# Patient Record
Sex: FEMALE | Race: WHITE | NOT HISPANIC OR LATINO | Employment: STUDENT | URBAN - METROPOLITAN AREA
[De-identification: names, ages, dates, MRNs, and addresses within clinical notes are randomized per-mention and may not be internally consistent; named-entity substitution may affect disease eponyms.]

---
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
## 2024-01-01 DIAGNOSIS — N94.6 DYSMENORRHEA: ICD-10-CM

## 2024-01-03 RX ORDER — NORGESTIMATE AND ETHINYL ESTRADIOL 7DAYSX3 LO
1 KIT ORAL
Qty: 90 TABLET | Refills: 3 | Status: SHIPPED | OUTPATIENT
Start: 2024-01-03

## 2024-01-07 PROBLEM — F33.1 MODERATE EPISODE OF RECURRENT MAJOR DEPRESSIVE DISORDER: Status: ACTIVE | Noted: 2022-11-06

## 2024-01-30 DIAGNOSIS — F41.9 ANXIETY: ICD-10-CM

## 2024-01-30 RX ORDER — ESCITALOPRAM OXALATE 20 MG/1
20 TABLET ORAL
Qty: 90 TABLET | Refills: 3 | Status: SHIPPED | OUTPATIENT
Start: 2024-01-30

## 2024-03-14 NOTE — TELEPHONE ENCOUNTER
Please let her know, just shared it to her portal!   SUBJECTIVE:   Doing wee. OOB to chair R upper drain removed by plastics this morning   OVERNIGHT EVENTS: none    Vital Signs Last 24 Hrs  T(C): 36.7 (14 Mar 2024 13:17), Max: 37.2 (14 Mar 2024 05:00)  T(F): 98 (14 Mar 2024 13:17), Max: 98.9 (14 Mar 2024 05:00)  HR: 78 (14 Mar 2024 13:17) (71 - 87)  BP: 102/59 (14 Mar 2024 13:17) (102/59 - 119/71)  BP(mean): --  RR: 18 (14 Mar 2024 13:17) (18 - 20)  SpO2: 98% (14 Mar 2024 13:17) (94% - 98%)    Parameters below as of 14 Mar 2024 05:00  Patient On (Oxygen Delivery Method): room air        PHYSICAL EXAM:    Constitutional: No Acute Distress     Neurological: Awake alert Ox3, Speech clear Following Commands, Moving all Extremities 5/5 except R HF 4/5 KF/KE 4+/5 DF/PF 5/5  R Lower HMV 70cc/ 24 hrs . Incision prineo  C/D/I     Pulmonary: Clear to Auscultation,     Cardiovascular: S1, S2, Regular rate and rhythm     Gastrointestinal: Soft, Non-tender, Non-distended     LABS:                        9.9    8.15  )-----------( 302      ( 13 Mar 2024 05:57 )             31.3    03-13    134<L>  |  99  |  20  ----------------------------<  102<H>  3.8   |  24  |  0.58    Ca    8.5      13 Mar 2024 05:57  Phos  3.4     03-13  Mg     2.0     03-13    TPro  5.1<L>  /  Alb  2.4<L>  /  TBili  0.4  /  DBili  x   /  AST  133<H>  /  ALT  160<H>  /  AlkPhos  211<H>  03-13          IMAGING:         MEDICATIONS:    acetaminophen     Tablet .. 1000 milliGRAM(s) Oral every 6 hours PRN Temp greater or equal to 38C (100.4F), Mild Pain (1 - 3)  gabapentin 600 milliGRAM(s) Oral every 8 hours  HYDROmorphone  Injectable 0.25 milliGRAM(s) IV Push every 4 hours PRN break through pain  methocarbamol 750 milliGRAM(s) Oral every 8 hours  ondansetron Injectable 4 milliGRAM(s) IV Push every 6 hours PRN Nausea and/or Vomiting  oxyCODONE    IR 5 milliGRAM(s) Oral every 4 hours PRN Moderate Pain (4 - 6)  oxyCODONE    IR 10 milliGRAM(s) Oral every 4 hours PRN Severe Pain (7 - 10)  magnesium hydroxide Suspension 30 milliLiter(s) Oral every 12 hours PRN Constipation  polyethylene glycol 3350 17 Gram(s) Oral two times a day  senna 2 Tablet(s) Oral at bedtime  enoxaparin Injectable 40 milliGRAM(s) SubCutaneous <User Schedule>  multivitamin 1 Tablet(s) Oral daily  sodium chloride 2 Gram(s) Oral every 8 hours      DIET:

## 2024-04-19 ENCOUNTER — PATIENT MESSAGE (OUTPATIENT)
Dept: PRIMARY CARE CLINIC | Facility: CLINIC | Age: 19
End: 2024-04-19
Payer: COMMERCIAL

## 2024-04-22 NOTE — TELEPHONE ENCOUNTER
No care due was identified.  [facility] Embedded Care Due Messages. Reference number: [id number].   4/22/2024 9:36:09 AM CDT

## 2024-04-23 RX ORDER — PROPRANOLOL HYDROCHLORIDE 10 MG/1
TABLET ORAL
Qty: 90 TABLET | Refills: 2 | Status: SHIPPED | OUTPATIENT
Start: 2024-04-23

## 2024-04-23 NOTE — TELEPHONE ENCOUNTER
Ade Marmolejo  is requesting a refill authorization.  Brief Assessment and Rationale for Refill:  Approve     Medication Therapy Plan:         Comments:     Note composed:4:47 AM 04/23/2024

## 2024-05-24 ENCOUNTER — TELEPHONE (OUTPATIENT)
Dept: OBSTETRICS AND GYNECOLOGY | Facility: CLINIC | Age: 19
End: 2024-05-24
Payer: COMMERCIAL

## 2024-05-24 NOTE — TELEPHONE ENCOUNTER
Messaging mother Lynn thru her personal chart, requesting a new pt appt for Ade for AUB on OCPs.  She is a new pt.

## 2024-05-27 NOTE — TELEPHONE ENCOUNTER
New pt for Dr. Mensah.  She was put on Tri lo adenike last year for heavy periods.  States her periods are about 7 days and the first couple days are heavy, no improvement with OCPs.  Scheduled at next available with Dr. Mensah.  Offered sooner with a different provider but she declined.

## 2024-07-19 ENCOUNTER — OFFICE VISIT (OUTPATIENT)
Dept: OBSTETRICS AND GYNECOLOGY | Facility: CLINIC | Age: 19
End: 2024-07-19
Payer: COMMERCIAL

## 2024-07-19 VITALS
SYSTOLIC BLOOD PRESSURE: 112 MMHG | DIASTOLIC BLOOD PRESSURE: 78 MMHG | WEIGHT: 138.44 LBS | BODY MASS INDEX: 22.01 KG/M2

## 2024-07-19 DIAGNOSIS — Z00.00 WELL WOMAN EXAM WITHOUT GYNECOLOGICAL EXAM: ICD-10-CM

## 2024-07-19 DIAGNOSIS — N93.9 ABNORMAL UTERINE BLEEDING (AUB): Primary | ICD-10-CM

## 2024-07-19 DIAGNOSIS — N94.3 PMS (PREMENSTRUAL SYNDROME): ICD-10-CM

## 2024-07-19 PROCEDURE — 3078F DIAST BP <80 MM HG: CPT | Mod: CPTII,S$GLB,, | Performed by: OBSTETRICS & GYNECOLOGY

## 2024-07-19 PROCEDURE — 99203 OFFICE O/P NEW LOW 30 MIN: CPT | Mod: S$GLB,,, | Performed by: OBSTETRICS & GYNECOLOGY

## 2024-07-19 PROCEDURE — 99999 PR PBB SHADOW E&M-EST. PATIENT-LVL III: CPT | Mod: PBBFAC,,, | Performed by: OBSTETRICS & GYNECOLOGY

## 2024-07-19 PROCEDURE — 3074F SYST BP LT 130 MM HG: CPT | Mod: CPTII,S$GLB,, | Performed by: OBSTETRICS & GYNECOLOGY

## 2024-07-19 PROCEDURE — 3008F BODY MASS INDEX DOCD: CPT | Mod: CPTII,S$GLB,, | Performed by: OBSTETRICS & GYNECOLOGY

## 2024-07-19 RX ORDER — DROSPIRENONE AND ETHINYL ESTRADIOL 0.02-3(28)
1 KIT ORAL DAILY
Qty: 90 TABLET | Refills: 4 | Status: SHIPPED | OUTPATIENT
Start: 2024-07-19 | End: 2024-08-18

## 2024-07-19 NOTE — PROGRESS NOTES
Subjective     Patient ID: Ade Marmolejo is a 18 y.o. female.    Chief Complaint:  heavy flow and pelvic cramping (While menstruating )      History of Present Illness  HPI  Pt is 17 y/o here to discuss her menses.  Pt started having periods at age 9.  Regular, lasts 7 days.  The first few days, has very heavy flow.  Uses pads and has to change them almost every hour.  Will having cramping pain with the 4 days of heavy flow.  Will have passage of some blood clots.  Pt also has some mood changes around her menses.  Having PMS symptoms.  Pt is on OCPs, started by Dr. Valladares.  Started on Tri Lo Shira and been on it about a year.  On the OCPs, still having heavy flow.  Also still having some mood changes around her menses, which is not improving with the pills. Pt is taking the pills daily and takes them regularly.  Pt also having headaches intermittently.  Not sure if happening near menses.  Not migraines.  No auras.  Pt denies any issues with hair growth or cystic acne.  Pt has never been sexually active.    GYN & OB History  Patient's last menstrual period was 2024 (exact date).   Date of Last Pap: No result found    OB History    Para Term  AB Living   0 0 0 0 0 0   SAB IAB Ectopic Multiple Live Births   0 0 0 0 0       Review of Systems  Review of Systems   Constitutional:  Negative for activity change, appetite change, chills, fatigue, fever and unexpected weight change.   Respiratory:  Negative for cough, shortness of breath and wheezing.    Cardiovascular:  Negative for chest pain and leg swelling.   Gastrointestinal:  Negative for abdominal pain, constipation, diarrhea, nausea and vomiting.   Endocrine: Negative for hair loss and hot flashes.   Genitourinary:  Positive for dysmenorrhea and menstrual problem. Negative for decreased libido, dyspareunia, dysuria, frequency, pelvic pain, vaginal bleeding, vaginal discharge and vaginal pain.   Integumentary:  Negative for acne, hair changes,  nipple discharge and breast skin changes.   Neurological:  Negative for headaches.   Psychiatric/Behavioral:  Negative for sleep disturbance.    Breast: Negative for mastodynia, nipple discharge and skin changes         Objective   Physical Exam:   Constitutional: She is oriented to person, place, and time. She appears well-developed and well-nourished. No distress.    HENT:   Head: Normocephalic and atraumatic.    Eyes: Conjunctivae and EOM are normal.    Neck: No thyromegaly present.    Cardiovascular:  Normal rate and intact distal pulses.             Pulmonary/Chest: Effort normal.        Abdominal: Soft. She exhibits no distension.             Musculoskeletal: Normal range of motion.       Neurological: She is alert and oriented to person, place, and time.    Skin: Skin is warm and dry. She is not diaphoretic.    Psychiatric: She has a normal mood and affect. Her behavior is normal.            Assessment and Plan     1. Abnormal uterine bleeding (AUB)    2. PMS (premenstrual syndrome)             Plan:  Ade was seen today for heavy flow and pelvic cramping.    Diagnoses and all orders for this visit:    Abnormal uterine bleeding (AUB)  - Discussed symptoms with patient and discussed medication that she is taking  - Given her symptoms, feel she would do better with a monophasic medication, opposed to the triphasic that she is on  - Recommend a low does monophasic OCP.  The lower dosing estrogen will help decrease flow, which appears heavy.  The anti-androgen effects of the hazel will help with acne and PMS symptoms.  The monophasic will help with breakthrough bleeding, mood changes, and her acne.    - Pt to reach out if not doing well with new medication.  -     drospirenone-ethinyl estradioL (HAZEL) 3-0.02 mg per tablet; Take 1 tablet by mouth once daily.    PMS (premenstrual syndrome)  -     drospirenone-ethinyl estradioL (HAZEL) 3-0.02 mg per tablet; Take 1 tablet by mouth once daily.      Well woman exam  without gynecological exam  - Defer paps until age 21.   - STD screening declined. Counseled on screening once she becomes sexually active.        No orders of the defined types were placed in this encounter.      No follow-ups on file.     Ml Finch MD  Obstetrics & Gynecology

## 2024-07-22 ENCOUNTER — OFFICE VISIT (OUTPATIENT)
Dept: PRIMARY CARE CLINIC | Facility: CLINIC | Age: 19
End: 2024-07-22
Payer: COMMERCIAL

## 2024-07-22 VITALS
WEIGHT: 135.56 LBS | HEIGHT: 66 IN | HEART RATE: 65 BPM | RESPIRATION RATE: 16 BRPM | DIASTOLIC BLOOD PRESSURE: 70 MMHG | OXYGEN SATURATION: 98 % | SYSTOLIC BLOOD PRESSURE: 112 MMHG | BODY MASS INDEX: 21.79 KG/M2

## 2024-07-22 DIAGNOSIS — Z00.00 ANNUAL PHYSICAL EXAM: Primary | ICD-10-CM

## 2024-07-22 DIAGNOSIS — L73.9 FOLLICULITIS: ICD-10-CM

## 2024-07-22 DIAGNOSIS — B07.9 VIRAL WARTS, UNSPECIFIED TYPE: ICD-10-CM

## 2024-07-22 DIAGNOSIS — Z13.1 SCREENING FOR DIABETES MELLITUS: ICD-10-CM

## 2024-07-22 DIAGNOSIS — F41.1 GAD (GENERALIZED ANXIETY DISORDER): ICD-10-CM

## 2024-07-22 PROCEDURE — 1159F MED LIST DOCD IN RCRD: CPT | Mod: CPTII,S$GLB,, | Performed by: STUDENT IN AN ORGANIZED HEALTH CARE EDUCATION/TRAINING PROGRAM

## 2024-07-22 PROCEDURE — 3008F BODY MASS INDEX DOCD: CPT | Mod: CPTII,S$GLB,, | Performed by: STUDENT IN AN ORGANIZED HEALTH CARE EDUCATION/TRAINING PROGRAM

## 2024-07-22 PROCEDURE — 1160F RVW MEDS BY RX/DR IN RCRD: CPT | Mod: CPTII,S$GLB,, | Performed by: STUDENT IN AN ORGANIZED HEALTH CARE EDUCATION/TRAINING PROGRAM

## 2024-07-22 PROCEDURE — 99999 PR PBB SHADOW E&M-EST. PATIENT-LVL IV: CPT | Mod: PBBFAC,,, | Performed by: STUDENT IN AN ORGANIZED HEALTH CARE EDUCATION/TRAINING PROGRAM

## 2024-07-22 PROCEDURE — 3078F DIAST BP <80 MM HG: CPT | Mod: CPTII,S$GLB,, | Performed by: STUDENT IN AN ORGANIZED HEALTH CARE EDUCATION/TRAINING PROGRAM

## 2024-07-22 PROCEDURE — 3074F SYST BP LT 130 MM HG: CPT | Mod: CPTII,S$GLB,, | Performed by: STUDENT IN AN ORGANIZED HEALTH CARE EDUCATION/TRAINING PROGRAM

## 2024-07-22 PROCEDURE — 99395 PREV VISIT EST AGE 18-39: CPT | Mod: S$GLB,,, | Performed by: STUDENT IN AN ORGANIZED HEALTH CARE EDUCATION/TRAINING PROGRAM

## 2024-07-22 RX ORDER — ALPRAZOLAM 0.25 MG/1
0.25 TABLET ORAL DAILY PRN
Qty: 30 TABLET | Refills: 0 | Status: SHIPPED | OUTPATIENT
Start: 2024-07-22 | End: 2024-07-22 | Stop reason: SDUPTHER

## 2024-07-22 RX ORDER — MUPIROCIN 20 MG/G
OINTMENT TOPICAL 3 TIMES DAILY
Qty: 30 G | Refills: 0 | Status: SHIPPED | OUTPATIENT
Start: 2024-07-22 | End: 2024-07-22 | Stop reason: SDUPTHER

## 2024-07-22 NOTE — TELEPHONE ENCOUNTER
No care due was identified.  [facility] Embedded Care Due Messages. Reference number: [id number].   7/22/2024 4:02:08 PM CDT

## 2024-07-22 NOTE — PROGRESS NOTES
SUBJECTIVE     Chief Complaint   Patient presents with    Annual Exam       HPI  Ade Marmolejo is a very pleasant 18 y.o. female with medical diagnoses as listed in the medical history and problem list that presents for annual exam.      MDD/  Generalized anxiety disorder.  Dx 2020  On Lexapro 20mg daily  Originally on Lexapro, then on Celexa which she did not tolerate, switched to Zoloft, then back to lexapro  Propranolol 10prn tid for panic attacks  -Denies SI/HI/AVH  Talk therapy prn       Irregular cycles: Started on OCP for irregular, heavy cycles. Taking OCP as directed without issues     Wart of hand: R hand.  Has seen Dermatology intermittently for wart freezing with temporary improvement.    Health Maintenance         Date Due Completion Date    Hepatitis C Screening Never done ---    HIV Screening Never done ---    Chlamydia Screening Never done ---    COVID-19 Vaccine (4 - 2023-24 season) 09/01/2023 1/17/2022    Influenza Vaccine (1) 09/01/2024 9/22/2023    TETANUS VACCINE 07/26/2026 7/26/2016    DTaP/Tdap/Td Vaccines (7 - Td or Tdap) 07/26/2026 7/26/2016            PAST MEDICAL HISTORY:  Past Medical History:   Diagnosis Date    Allergy     Scoliosis        PAST SURGICAL HISTORY:  Past Surgical History:   Procedure Laterality Date    TYMPANOSTOMY TUBE PLACEMENT  01/01/2007       SOCIAL HISTORY:  Social History     Socioeconomic History    Marital status: Single   Tobacco Use    Smoking status: Never    Smokeless tobacco: Never   Substance and Sexual Activity    Alcohol use: No    Drug use: No    Sexual activity: Never   Social History Narrative    Lives with parents, 2 sisters, two brothers; no pets       FAMILY HISTORY:  Family History   Problem Relation Name Age of Onset    Hypertension Mother mom     Asthma Sister maylin     Allergies Sister maylin     Other Brother          autoimmune neutropenia    Other Paternal Grandfather          parkinsin's    Birth defects Neg Hx      Cancer Neg Hx       "Chromosomal disorder Neg Hx      Diabetes Neg Hx      Early death Neg Hx      Heart disease Neg Hx      Hyperlipidemia Neg Hx      Mental illness Neg Hx      Seizures Neg Hx      Thyroid disease Neg Hx         ALLERGIES AND MEDICATIONS: updated and reviewed.  Review of patient's allergies indicates:  No Known Allergies  Current Outpatient Medications   Medication Sig Dispense Refill    drospirenone-ethinyl estradioL (HAZEL) 3-0.02 mg per tablet Take 1 tablet by mouth once daily. 90 tablet 4    EScitalopram oxalate (LEXAPRO) 20 MG tablet TAKE 1 TABLET(20 MG) BY MOUTH EVERY DAY 90 tablet 3    hydrOXYzine HCL (ATARAX) 25 MG tablet Take 1 tablet (25 mg total) by mouth nightly as needed (Insomnia). 90 tablet 1    propranoloL (INDERAL) 10 MG tablet TAKE 1 TABLET(10 MG) BY MOUTH THREE TIMES DAILY 90 tablet 2    ALPRAZolam (XANAX) 0.25 MG tablet Take 1 tablet (0.25 mg total) by mouth daily as needed for Anxiety. 30 tablet 0    mupirocin (BACTROBAN) 2 % ointment Apply topically 3 (three) times daily. 30 g 0     No current facility-administered medications for this visit.       ROS  Review of Systems   Constitutional:  Negative for fever and weight loss.   Respiratory:  Negative for cough and shortness of breath.    Cardiovascular:  Negative for chest pain and palpitations.   Gastrointestinal:  Negative for abdominal pain, constipation, diarrhea, nausea and vomiting.   Genitourinary:  Negative for dysuria.   Musculoskeletal:  Negative for back pain and joint pain.   Skin:  Positive for rash.   Neurological:  Negative for dizziness, weakness and headaches.   Psychiatric/Behavioral:  Negative for depression. The patient is nervous/anxious.          OBJECTIVE     Physical Exam  Vitals:    07/22/24 1049   BP: 112/70   Pulse: 65   Resp: 16    Body mass index is 21.88 kg/m².  Weight: 61.5 kg (135 lb 9.3 oz)   Height: 5' 6" (167.6 cm)     Physical Exam  HENT:      Head: Normocephalic and atraumatic.      Nose: Nose normal.      " Mouth/Throat:      Mouth: Mucous membranes are moist.      Pharynx: Oropharynx is clear.   Eyes:      Extraocular Movements: Extraocular movements intact.      Conjunctiva/sclera: Conjunctivae normal.      Pupils: Pupils are equal, round, and reactive to light.   Cardiovascular:      Rate and Rhythm: Normal rate and regular rhythm.   Pulmonary:      Effort: Pulmonary effort is normal.      Breath sounds: Normal breath sounds.   Musculoskeletal:         General: No swelling. Normal range of motion.      Cervical back: Normal range of motion.      Right lower leg: No edema.      Left lower leg: No edema.   Skin:     General: Skin is warm.      Findings: Rash (folliculitis inner thigh) present. No lesion.   Neurological:      General: No focal deficit present.      Mental Status: She is alert and oriented to person, place, and time.      Motor: No weakness.   Psychiatric:         Mood and Affect: Mood normal.         Thought Content: Thought content normal.               ASSESSMENT     18 y.o. female with     1. Annual physical exam    2. Screening for diabetes mellitus    3. MORGAN (generalized anxiety disorder)    4. Folliculitis    5. Viral warts, unspecified type        PLAN:     1. Annual physical exam  -     TSH; Future; Expected date: 07/22/2024  -     Lipid Panel; Future; Expected date: 07/22/2024  -     Comprehensive Metabolic Panel; Future; Expected date: 07/22/2024  -     CBC Auto Differential; Future; Expected date: 07/22/2024    2. Screening for diabetes mellitus  -     Hemoglobin A1C; Future; Expected date: 07/22/2024    3. MORGAN (generalized anxiety disorder)  Overview:  Dx 2020  Cont propranolol 10 tid prn for panic attacks  Lexapro 20mg daily    Stable on medications, continue regimen        Orders:  -     ALPRAZolam (XANAX) 0.25 MG tablet; Take 1 tablet (0.25 mg total) by mouth daily as needed for Anxiety.  Dispense: 30 tablet; Refill: 0    4. Folliculitis  -     mupirocin (BACTROBAN) 2 % ointment; Apply  topically 3 (three) times daily.  Dispense: 30 g; Refill: 0    5. Viral warts, unspecified type  -     Ambulatory referral/consult to Dermatology; Future; Expected date: 07/29/2024          Ira Valladares MD

## 2024-07-23 RX ORDER — MUPIROCIN 20 MG/G
OINTMENT TOPICAL 3 TIMES DAILY
Qty: 30 G | Refills: 0 | Status: SHIPPED | OUTPATIENT
Start: 2024-07-23

## 2024-07-23 RX ORDER — ALPRAZOLAM 0.25 MG/1
0.25 TABLET ORAL DAILY PRN
Qty: 30 TABLET | Refills: 0 | Status: SHIPPED | OUTPATIENT
Start: 2024-07-23 | End: 2024-08-22

## 2024-09-27 RX ORDER — PROPRANOLOL HYDROCHLORIDE 10 MG/1
TABLET ORAL
Qty: 270 TABLET | Refills: 3 | Status: SHIPPED | OUTPATIENT
Start: 2024-09-27

## 2024-10-28 DIAGNOSIS — F41.1 GAD (GENERALIZED ANXIETY DISORDER): ICD-10-CM

## 2024-10-29 RX ORDER — HYDROXYZINE HYDROCHLORIDE 25 MG/1
25 TABLET, FILM COATED ORAL NIGHTLY PRN
Qty: 90 TABLET | Refills: 1 | Status: SHIPPED | OUTPATIENT
Start: 2024-10-29

## 2024-11-08 DIAGNOSIS — F41.9 ANXIETY: ICD-10-CM

## 2024-11-08 RX ORDER — ESCITALOPRAM OXALATE 20 MG/1
20 TABLET ORAL
Qty: 90 TABLET | Refills: 2 | Status: SHIPPED | OUTPATIENT
Start: 2024-11-08

## 2024-11-08 NOTE — TELEPHONE ENCOUNTER
No care due was identified.  [facility] Embedded Care Due Messages. Reference number: [id number].   11/08/2024 2:34:48 PM CST

## 2024-11-09 NOTE — TELEPHONE ENCOUNTER
Refill Decision Note   [facility]  is requesting a refill authorization.  Brief Assessment and Rationale for Refill:  Approve     Medication Therapy Plan:       Medication Reconciliation Completed: No   Comments:     No Care Gaps recommended.     Note composed:6:33 PM 11/08/2024

## 2024-11-12 ENCOUNTER — PATIENT MESSAGE (OUTPATIENT)
Dept: OBSTETRICS AND GYNECOLOGY | Facility: CLINIC | Age: 19
End: 2024-11-12
Payer: COMMERCIAL

## 2024-12-08 ENCOUNTER — PATIENT MESSAGE (OUTPATIENT)
Dept: OBSTETRICS AND GYNECOLOGY | Facility: CLINIC | Age: 19
End: 2024-12-08
Payer: COMMERCIAL

## 2024-12-08 DIAGNOSIS — N93.9 ABNORMAL UTERINE BLEEDING (AUB): Primary | ICD-10-CM

## 2024-12-08 DIAGNOSIS — N94.3 PMS (PREMENSTRUAL SYNDROME): ICD-10-CM

## 2024-12-10 RX ORDER — NORETHINDRONE ACETATE AND ETHINYL ESTRADIOL 1MG-20(21)
1 KIT ORAL DAILY
Qty: 90 TABLET | Refills: 4 | Status: SHIPPED | OUTPATIENT
Start: 2024-12-10 | End: 2025-12-10

## 2025-01-08 ENCOUNTER — PATIENT MESSAGE (OUTPATIENT)
Dept: PRIMARY CARE CLINIC | Facility: CLINIC | Age: 20
End: 2025-01-08
Payer: COMMERCIAL

## 2025-02-19 ENCOUNTER — PATIENT MESSAGE (OUTPATIENT)
Dept: OBSTETRICS AND GYNECOLOGY | Facility: CLINIC | Age: 20
End: 2025-02-19
Payer: COMMERCIAL

## 2025-02-24 RX ORDER — DROSPIRENONE AND ESTETROL 3-14.2(28)
1 KIT ORAL DAILY
Qty: 84 TABLET | Refills: 4 | Status: SHIPPED | OUTPATIENT
Start: 2025-02-24

## 2025-03-21 ENCOUNTER — PATIENT MESSAGE (OUTPATIENT)
Dept: PRIMARY CARE CLINIC | Facility: CLINIC | Age: 20
End: 2025-03-21
Payer: COMMERCIAL

## 2025-05-09 ENCOUNTER — PATIENT MESSAGE (OUTPATIENT)
Dept: OBSTETRICS AND GYNECOLOGY | Facility: CLINIC | Age: 20
End: 2025-05-09
Payer: COMMERCIAL

## 2025-07-17 ENCOUNTER — OFFICE VISIT (OUTPATIENT)
Dept: OBSTETRICS AND GYNECOLOGY | Facility: CLINIC | Age: 20
End: 2025-07-17
Payer: COMMERCIAL

## 2025-07-17 ENCOUNTER — PATIENT OUTREACH (OUTPATIENT)
Dept: ADMINISTRATIVE | Facility: HOSPITAL | Age: 20
End: 2025-07-17
Payer: COMMERCIAL

## 2025-07-17 VITALS
DIASTOLIC BLOOD PRESSURE: 56 MMHG | WEIGHT: 154.75 LBS | HEIGHT: 66 IN | SYSTOLIC BLOOD PRESSURE: 88 MMHG | BODY MASS INDEX: 24.87 KG/M2

## 2025-07-17 DIAGNOSIS — M62.89 PELVIC FLOOR TENSION: ICD-10-CM

## 2025-07-17 DIAGNOSIS — F41.9 ANXIETY: ICD-10-CM

## 2025-07-17 DIAGNOSIS — N92.0 MENORRHAGIA WITH REGULAR CYCLE: Primary | ICD-10-CM

## 2025-07-17 DIAGNOSIS — F32.81 PMDD (PREMENSTRUAL DYSPHORIC DISORDER): ICD-10-CM

## 2025-07-17 PROCEDURE — 3008F BODY MASS INDEX DOCD: CPT | Mod: CPTII,S$GLB,, | Performed by: OBSTETRICS & GYNECOLOGY

## 2025-07-17 PROCEDURE — 1159F MED LIST DOCD IN RCRD: CPT | Mod: CPTII,S$GLB,, | Performed by: OBSTETRICS & GYNECOLOGY

## 2025-07-17 PROCEDURE — 3074F SYST BP LT 130 MM HG: CPT | Mod: CPTII,S$GLB,, | Performed by: OBSTETRICS & GYNECOLOGY

## 2025-07-17 PROCEDURE — 3078F DIAST BP <80 MM HG: CPT | Mod: CPTII,S$GLB,, | Performed by: OBSTETRICS & GYNECOLOGY

## 2025-07-17 PROCEDURE — 99215 OFFICE O/P EST HI 40 MIN: CPT | Mod: S$GLB,,, | Performed by: OBSTETRICS & GYNECOLOGY

## 2025-07-17 PROCEDURE — 99999 PR PBB SHADOW E&M-EST. PATIENT-LVL III: CPT | Mod: PBBFAC,,, | Performed by: OBSTETRICS & GYNECOLOGY

## 2025-07-17 RX ORDER — TRANEXAMIC ACID 650 MG/1
1300 TABLET ORAL 3 TIMES DAILY
Qty: 30 TABLET | Refills: 12 | Status: SHIPPED | OUTPATIENT
Start: 2025-07-17 | End: 2025-07-22

## 2025-07-17 RX ORDER — ESCITALOPRAM OXALATE 20 MG/1
TABLET ORAL
Qty: 90 TABLET | Refills: 4 | Status: SHIPPED | OUTPATIENT
Start: 2025-07-17 | End: 2025-07-21 | Stop reason: SDUPTHER

## 2025-07-17 NOTE — PROGRESS NOTES
Subjective     Patient ID: Ade Marmolejo is a 19 y.o. female.    Chief Complaint:  follow up menorrhagia and PMDD    History of Present Illness  HPI      Pt is a 20 y/o here for follow up of her menses and PMS.  Pt was previously seen and started on OCPs (Jaclyn) to help with heavy menses and mood changes that have been occurring around her menses.  Pt also takes lexapro daily.  A few months into using the Jaclyn, the patient messaged that she was having prolonged bleeding with the OCPs and wanted to change to another option.  Pt was then changed over to Junel Fe, which the patient trialed for a few months.  This OCP also caused irregular bleeding and pt reports that the Junel worsened her mood throughout the entire month and especially her PMS.  After a discussion, given that the Jaclyn worked well for her mood but she did not like the bleeding, pt was started on Nexstellis at the end of February/beginning of March.  Pt reports that she is not currently taking any medication.  Pt reports that she stopped the Nexstellis after 2-3 months because she was having irregular bleeding.  Pt reports that she may have had improvement with her mood but is not sure.  Since being off the OCPs, pt is having regular menses but feels like they are not normal because the last around 8 days (5 days bleeding and some spotting after).  Feels like her menses is heavy.  Having cramping.  Pt wants medication to help with her symptoms but doesn't like the irregular bleeding that the medicines initially cause.  Still having mood changes around her menses    Pt also reports having pain with tampon insertion.  Pt reports that sometimes when she has the tampon in place, it is so painful that she has to remove the tampon and move to pads.  Pt has never been sexually active.  Does not feel like she is tensing up with the tampon in place but notes that she does anticipate it hurting.         Previous HPI:   Pt is 17 y/o here to discuss her menses.  Pt  started having periods at age 9.  Regular, lasts 7 days.  The first few days, has very heavy flow.  Uses pads and has to change them almost every hour.  Will having cramping pain with the 4 days of heavy flow.  Will have passage of some blood clots.  Pt also has some mood changes around her menses.  Having PMS symptoms.  Pt is on OCPs, started by Dr. Valladares.  Started on Tri Lo Shira and been on it about a year.  On the OCPs, still having heavy flow.  Also still having some mood changes around her menses, which is not improving with the pills. Pt is taking the pills daily and takes them regularly.  Pt also having headaches intermittently.  Not sure if happening near menses.  Not migraines.  No auras.  Pt denies any issues with hair growth or cystic acne.  Pt has never been sexually active.    GYN & OB History  No LMP recorded. (Menstrual status: Birth Control).   Date of Last Pap: No result found    OB History    Para Term  AB Living   0 0 0 0 0 0   SAB IAB Ectopic Multiple Live Births   0 0 0 0 0       Review of Systems  Review of Systems   Constitutional:  Negative for activity change, appetite change, chills, fatigue, fever and unexpected weight change.   Respiratory:  Negative for cough, shortness of breath and wheezing.    Cardiovascular:  Negative for chest pain and leg swelling.   Gastrointestinal:  Negative for abdominal pain, constipation, diarrhea, nausea and vomiting.   Endocrine: Negative for hair loss and hot flashes.   Genitourinary:  Positive for dysmenorrhea and menstrual problem. Negative for decreased libido, dyspareunia, dysuria, frequency, pelvic pain, vaginal bleeding, vaginal discharge and vaginal pain.   Integumentary:  Negative for acne, hair changes, nipple discharge and breast skin changes.   Neurological:  Negative for headaches.   Psychiatric/Behavioral:  Negative for sleep disturbance.    Breast: Negative for mastodynia, nipple discharge and skin changes         Objective    Physical Exam:   Constitutional: She is oriented to person, place, and time. She appears well-developed and well-nourished. No distress.    HENT:   Head: Normocephalic and atraumatic.    Eyes: Conjunctivae and EOM are normal.    Neck: No thyromegaly present.    Cardiovascular:  Normal rate and intact distal pulses.             Pulmonary/Chest: Effort normal.        Abdominal: Soft. She exhibits no distension.     Genitourinary:    Genitourinary Comments: Normal external genitalia.  Normal hair distribution.  Urethral meatus normal. No cervical lesions or masses.  No vaginal bleeding noted.  Vaginal walls appear normal. No signs of vaginal septum, injury, etc to explain pain.  Pt does have some pelvic floor tension that is palpable on bimanual exam. No adnexal or uterine tenderness.  No palpable adnexal masses.             Musculoskeletal: Normal range of motion.       Neurological: She is alert and oriented to person, place, and time.    Skin: Skin is warm and dry. She is not diaphoretic.    Psychiatric: She has a normal mood and affect. Her behavior is normal.            Assessment and Plan     1. Menorrhagia with regular cycle    2. PMDD (premenstrual dysphoric disorder)    3. Anxiety    4. Pelvic floor tension               Plan:  Ade was seen today for annual exam.    Diagnoses and all orders for this visit:    Menorrhagia with regular cycle  - A long discussion had with patient about her menses and medications that can help improve her bleeding.  Emphasized multiple times that each of the pills that she took she only trialed for about 3 months.  Discussed the normalcy of irregular bleeding when first starting the medication and the need to give it time to see its full effects.  Discussed the option to try the pill again and give it more time.  Pt does not seem open to that idea at this time.  Discussed other hormonal options.  Pt declines  - Discussed taking ibuprofen starting 1-2 days prior to her menses  to help with the dysmenorrhea and menorrhagia.  Pt is worried about a stomach bleed and doesn't feel like she should be taking ibuprofen 600-800mg-- pt is worried about side effects  - Discussed Lysteda as an options to help with her heavy menses.  Discussed the risks associated with this medication, including risk of blood clots.  Discussion had comparing risks associated with lysteda vs ibuprofen.  Pt would like to start Lysteda  - Explained in detail that this will NOT help her mood changes around her menses.   -     tranexamic acid (LYSTEDA) 650 mg tablet; Take 2 tablets (1,300 mg total) by mouth 3 (three) times daily. for 5 days    PMDD (premenstrual dysphoric disorder)       - Discussed her PMDD.  Discussed option to increase SSRI use around menses  - Will trial increase in lexapro to 40mg for the week prior to menses.  Rx sent    Anxiety  -     EScitalopram oxalate (LEXAPRO) 20 MG tablet; Take 1 tablet by mouth daily.  For the week prior to menses, take 2 tablets by mouth daily    Pelvic floor tension  - Discussed tension of muscles on exam  - Discussed that she may be involuntarily tensing her pelvic floor, causing discomfort with tampon placement  - Will send in referral for pelvic floor PT  -     Ambulatory Referral/Consult to Physical Therapy    Face to Face time with patient: 50 min    60 minutes of total time spent on the encounter, which includes face to face time and non-face to face time preparing to see the patient (eg, review of tests), Obtaining and/or reviewing separately obtained history, Documenting clinical information in the electronic or other health record, Independently interpreting results (not separately reported) and communicating results to the patient/family/caregiver, or Care coordination (not separately reported).      Orders Placed This Encounter   Procedures    Ambulatory Referral/Consult to Physical Therapy       No follow-ups on file.     Ml Finch MD  Obstetrics &  Gynecology

## 2025-07-21 ENCOUNTER — OFFICE VISIT (OUTPATIENT)
Dept: PRIMARY CARE CLINIC | Facility: CLINIC | Age: 20
End: 2025-07-21
Payer: COMMERCIAL

## 2025-07-21 ENCOUNTER — LAB VISIT (OUTPATIENT)
Dept: LAB | Facility: HOSPITAL | Age: 20
End: 2025-07-21
Attending: STUDENT IN AN ORGANIZED HEALTH CARE EDUCATION/TRAINING PROGRAM
Payer: COMMERCIAL

## 2025-07-21 VITALS
SYSTOLIC BLOOD PRESSURE: 112 MMHG | DIASTOLIC BLOOD PRESSURE: 70 MMHG | BODY MASS INDEX: 25.19 KG/M2 | HEART RATE: 82 BPM | WEIGHT: 156.06 LBS | OXYGEN SATURATION: 97 %

## 2025-07-21 DIAGNOSIS — Z13.1 SCREENING FOR DIABETES MELLITUS: ICD-10-CM

## 2025-07-21 DIAGNOSIS — R23.2 HOT FLASHES: ICD-10-CM

## 2025-07-21 DIAGNOSIS — Z00.00 ANNUAL PHYSICAL EXAM: ICD-10-CM

## 2025-07-21 DIAGNOSIS — F41.9 ANXIETY: ICD-10-CM

## 2025-07-21 DIAGNOSIS — Z00.00 ANNUAL PHYSICAL EXAM: Primary | ICD-10-CM

## 2025-07-21 LAB
25(OH)D3+25(OH)D2 SERPL-MCNC: 24 NG/ML (ref 30–96)
ABSOLUTE EOSINOPHIL (OHS): 0.17 K/UL
ABSOLUTE MONOCYTE (OHS): 0.45 K/UL (ref 0.3–1)
ABSOLUTE NEUTROPHIL COUNT (OHS): 5.31 K/UL (ref 1.8–7.7)
ALBUMIN SERPL BCP-MCNC: 4.5 G/DL (ref 3.5–5.2)
ALP SERPL-CCNC: 90 UNIT/L (ref 40–150)
ALT SERPL W/O P-5'-P-CCNC: 17 UNIT/L (ref 10–44)
ANION GAP (OHS): 10 MMOL/L (ref 8–16)
AST SERPL-CCNC: 20 UNIT/L (ref 11–45)
BASOPHILS # BLD AUTO: 0.02 K/UL
BASOPHILS NFR BLD AUTO: 0.3 %
BILIRUB SERPL-MCNC: 0.2 MG/DL (ref 0.1–1)
BUN SERPL-MCNC: 7 MG/DL (ref 6–20)
CALCIUM SERPL-MCNC: 9.6 MG/DL (ref 8.7–10.5)
CHLORIDE SERPL-SCNC: 104 MMOL/L (ref 95–110)
CHOLEST SERPL-MCNC: 163 MG/DL (ref 120–199)
CHOLEST/HDLC SERPL: 2.1 {RATIO} (ref 2–5)
CO2 SERPL-SCNC: 23 MMOL/L (ref 23–29)
CREAT SERPL-MCNC: 0.6 MG/DL (ref 0.5–1.4)
EAG (OHS): 94 MG/DL (ref 68–131)
ERYTHROCYTE [DISTWIDTH] IN BLOOD BY AUTOMATED COUNT: 12.6 % (ref 11.5–14.5)
FOLATE SERPL-MCNC: 10.4 NG/ML (ref 4–24)
GFR SERPLBLD CREATININE-BSD FMLA CKD-EPI: >60 ML/MIN/1.73/M2
GLUCOSE SERPL-MCNC: 80 MG/DL (ref 70–110)
HBA1C MFR BLD: 4.9 % (ref 4–5.6)
HCT VFR BLD AUTO: 42.2 % (ref 37–48.5)
HDLC SERPL-MCNC: 79 MG/DL (ref 40–75)
HDLC SERPL: 48.5 % (ref 20–50)
HGB BLD-MCNC: 13.7 GM/DL (ref 12–16)
IMM GRANULOCYTES # BLD AUTO: 0.03 K/UL (ref 0–0.04)
IMM GRANULOCYTES NFR BLD AUTO: 0.4 % (ref 0–0.5)
IRON SATN MFR SERPL: 12 % (ref 20–50)
IRON SERPL-MCNC: 52 UG/DL (ref 30–160)
LDLC SERPL CALC-MCNC: 68.2 MG/DL (ref 63–159)
LYMPHOCYTES # BLD AUTO: 1.98 K/UL (ref 1–4.8)
MCH RBC QN AUTO: 29.1 PG (ref 27–31)
MCHC RBC AUTO-ENTMCNC: 32.5 G/DL (ref 32–36)
MCV RBC AUTO: 90 FL (ref 82–98)
NONHDLC SERPL-MCNC: 84 MG/DL
NUCLEATED RBC (/100WBC) (OHS): 0 /100 WBC
PLATELET # BLD AUTO: 306 K/UL (ref 150–450)
PMV BLD AUTO: 10.2 FL (ref 9.2–12.9)
POTASSIUM SERPL-SCNC: 3.7 MMOL/L (ref 3.5–5.1)
PROT SERPL-MCNC: 7.8 GM/DL (ref 6–8.4)
RBC # BLD AUTO: 4.7 M/UL (ref 4–5.4)
RELATIVE EOSINOPHIL (OHS): 2.1 %
RELATIVE LYMPHOCYTE (OHS): 24.9 % (ref 18–48)
RELATIVE MONOCYTE (OHS): 5.7 % (ref 4–15)
RELATIVE NEUTROPHIL (OHS): 66.6 % (ref 38–73)
SODIUM SERPL-SCNC: 137 MMOL/L (ref 136–145)
TIBC SERPL-MCNC: 450 UG/DL (ref 250–450)
TRANSFERRIN SERPL-MCNC: 304 MG/DL (ref 200–375)
TRIGL SERPL-MCNC: 79 MG/DL (ref 30–150)
TSH SERPL-ACNC: 2.04 UIU/ML (ref 0.4–4)
VIT B12 SERPL-MCNC: 458 PG/ML (ref 210–950)
WBC # BLD AUTO: 7.96 K/UL (ref 3.9–12.7)

## 2025-07-21 PROCEDURE — 84443 ASSAY THYROID STIM HORMONE: CPT

## 2025-07-21 PROCEDURE — 80053 COMPREHEN METABOLIC PANEL: CPT

## 2025-07-21 PROCEDURE — 84207 ASSAY OF VITAMIN B-6: CPT

## 2025-07-21 PROCEDURE — 82306 VITAMIN D 25 HYDROXY: CPT

## 2025-07-21 PROCEDURE — 82607 VITAMIN B-12: CPT

## 2025-07-21 PROCEDURE — 83036 HEMOGLOBIN GLYCOSYLATED A1C: CPT

## 2025-07-21 PROCEDURE — 80061 LIPID PANEL: CPT

## 2025-07-21 PROCEDURE — 83540 ASSAY OF IRON: CPT

## 2025-07-21 PROCEDURE — 36415 COLL VENOUS BLD VENIPUNCTURE: CPT

## 2025-07-21 PROCEDURE — 3078F DIAST BP <80 MM HG: CPT | Mod: CPTII,S$GLB,, | Performed by: STUDENT IN AN ORGANIZED HEALTH CARE EDUCATION/TRAINING PROGRAM

## 2025-07-21 PROCEDURE — 99214 OFFICE O/P EST MOD 30 MIN: CPT | Mod: 25,S$GLB,, | Performed by: STUDENT IN AN ORGANIZED HEALTH CARE EDUCATION/TRAINING PROGRAM

## 2025-07-21 PROCEDURE — 99999 PR PBB SHADOW E&M-EST. PATIENT-LVL III: CPT | Mod: PBBFAC,,, | Performed by: STUDENT IN AN ORGANIZED HEALTH CARE EDUCATION/TRAINING PROGRAM

## 2025-07-21 PROCEDURE — 82746 ASSAY OF FOLIC ACID SERUM: CPT

## 2025-07-21 PROCEDURE — 1159F MED LIST DOCD IN RCRD: CPT | Mod: CPTII,S$GLB,, | Performed by: STUDENT IN AN ORGANIZED HEALTH CARE EDUCATION/TRAINING PROGRAM

## 2025-07-21 PROCEDURE — 3074F SYST BP LT 130 MM HG: CPT | Mod: CPTII,S$GLB,, | Performed by: STUDENT IN AN ORGANIZED HEALTH CARE EDUCATION/TRAINING PROGRAM

## 2025-07-21 PROCEDURE — 3044F HG A1C LEVEL LT 7.0%: CPT | Mod: CPTII,S$GLB,, | Performed by: STUDENT IN AN ORGANIZED HEALTH CARE EDUCATION/TRAINING PROGRAM

## 2025-07-21 PROCEDURE — 99395 PREV VISIT EST AGE 18-39: CPT | Mod: S$GLB,,, | Performed by: STUDENT IN AN ORGANIZED HEALTH CARE EDUCATION/TRAINING PROGRAM

## 2025-07-21 PROCEDURE — 3008F BODY MASS INDEX DOCD: CPT | Mod: CPTII,S$GLB,, | Performed by: STUDENT IN AN ORGANIZED HEALTH CARE EDUCATION/TRAINING PROGRAM

## 2025-07-21 PROCEDURE — 85025 COMPLETE CBC W/AUTO DIFF WBC: CPT

## 2025-07-21 PROCEDURE — 1160F RVW MEDS BY RX/DR IN RCRD: CPT | Mod: CPTII,S$GLB,, | Performed by: STUDENT IN AN ORGANIZED HEALTH CARE EDUCATION/TRAINING PROGRAM

## 2025-07-21 RX ORDER — ESCITALOPRAM OXALATE 20 MG/1
40 TABLET ORAL DAILY
Qty: 90 TABLET | Refills: 3 | Status: SHIPPED | OUTPATIENT
Start: 2025-07-21

## 2025-07-21 NOTE — PROGRESS NOTES
SUBJECTIVE     Chief Complaint   Patient presents with    Annual Exam       HPI  Ade Marmolejo is a very pleasant 19 y.o. female with medical diagnoses as listed in the medical history and problem list that presents for annual exam.      MDD/  Generalized anxiety disorder.  Dx 2020  On Lexapro 20mg daily  Originally on Lexapro, then on Celexa which she did not tolerate, switched to Zoloft, then back to lexapro  Propranolol 10prn tid for panic attacks  -Denies SI/HI/AVH  Talk therapy prn       Irregular cycles: Started on OCP for irregular, heavy cycles. Stopped due to no help with cycle.         Health Maintenance         Date Due Completion Date    Hepatitis C Screening Never done ---    HIV Screening Never done ---    Chlamydia Screening Never done ---    COVID-19 Vaccine (4 - 2024-25 season) 09/01/2024 1/17/2022    Influenza Vaccine (1) 09/01/2025 9/18/2024    TETANUS VACCINE 07/26/2026 7/26/2016    DTaP/Tdap/Td Vaccines (7 - Td or Tdap) 07/26/2026 7/26/2016    RSV Vaccine (Age 60+ and Pregnant patients) (1 - 1-dose 75+ series) 09/28/2079 ---          Separate EM concern:    Fatigue-Endorses increased fatigue over the past several months. No changes in activities, diet, or sleep. No medication changes. No recent illnesses.  Occasionally feels hot flashes    PAST MEDICAL HISTORY:  Past Medical History:   Diagnosis Date    Allergy     Scoliosis        PAST SURGICAL HISTORY:  Past Surgical History:   Procedure Laterality Date    TYMPANOSTOMY TUBE PLACEMENT  01/01/2007       SOCIAL HISTORY:  Social History     Socioeconomic History    Marital status: Single   Tobacco Use    Smoking status: Never    Smokeless tobacco: Never   Substance and Sexual Activity    Alcohol use: No    Drug use: No    Sexual activity: Never   Social History Narrative    Lives with parents, 2 sisters, two brothers; no pets       FAMILY HISTORY:  Family History   Problem Relation Name Age of Onset    Hypertension Mother mom     Skin cancer  Mother mom     Asthma Sister maylin     Allergies Sister maylin     Other Brother          autoimmune neutropenia    Other Paternal Grandfather          parkinsin's    Birth defects Neg Hx      Cancer Neg Hx      Chromosomal disorder Neg Hx      Diabetes Neg Hx      Early death Neg Hx      Heart disease Neg Hx      Hyperlipidemia Neg Hx      Mental illness Neg Hx      Seizures Neg Hx      Thyroid disease Neg Hx         ALLERGIES AND MEDICATIONS: updated and reviewed.  Review of patient's allergies indicates:  No Known Allergies  Current Outpatient Medications   Medication Sig Dispense Refill    ALPRAZolam (XANAX) 0.25 MG tablet Take 1 tablet (0.25 mg total) by mouth daily as needed for Anxiety. 30 tablet 0    EScitalopram oxalate (LEXAPRO) 20 MG tablet Take 2 tablets (40 mg total) by mouth once daily. Take 1 tablet by mouth daily.  For the week prior to menses, take 2 tablets by mouth daily 90 tablet 3    hydrOXYzine HCL (ATARAX) 25 MG tablet Take 1 tablet (25 mg total) by mouth nightly as needed (Insomnia). 90 tablet 1    propranoloL (INDERAL) 10 MG tablet TAKE 1 TABLET(10 MG) BY MOUTH THREE TIMES DAILY 270 tablet 3     No current facility-administered medications for this visit.       ROS  Review of Systems   Constitutional:  Negative for fever and weight loss.   Respiratory:  Negative for cough and shortness of breath.    Cardiovascular:  Negative for chest pain and palpitations.   Gastrointestinal:  Negative for abdominal pain, constipation, diarrhea, nausea and vomiting.   Genitourinary:  Negative for dysuria.   Musculoskeletal:  Negative for back pain and joint pain.   Skin:  Positive for rash.   Neurological:  Negative for dizziness, weakness and headaches.   Psychiatric/Behavioral:  Negative for depression. The patient is nervous/anxious.          OBJECTIVE     Physical Exam  Vitals:    07/21/25 1309   BP: 112/70   Pulse: 82    Body mass index is 25.19 kg/m².  Weight: 70.8 kg (156 lb 1.4 oz)         Physical  Exam  HENT:      Head: Normocephalic and atraumatic.      Nose: Nose normal.      Mouth/Throat:      Mouth: Mucous membranes are moist.      Pharynx: Oropharynx is clear.   Eyes:      Extraocular Movements: Extraocular movements intact.      Conjunctiva/sclera: Conjunctivae normal.      Pupils: Pupils are equal, round, and reactive to light.   Cardiovascular:      Rate and Rhythm: Normal rate and regular rhythm.   Pulmonary:      Effort: Pulmonary effort is normal.      Breath sounds: Normal breath sounds.   Musculoskeletal:         General: No swelling. Normal range of motion.      Cervical back: Normal range of motion.      Right lower leg: No edema.      Left lower leg: No edema.   Skin:     General: Skin is warm.      Findings: No lesion or rash.   Neurological:      General: No focal deficit present.      Mental Status: She is alert and oriented to person, place, and time.      Motor: No weakness.   Psychiatric:         Mood and Affect: Mood normal.         Thought Content: Thought content normal.               ASSESSMENT     19 y.o. female with     1. Annual physical exam    2. Screening for diabetes mellitus    3. Anxiety    4. Hot flashes        PLAN:     1. Annual physical exam  -     TSH; Future; Expected date: 07/21/2025  -     Lipid Panel; Future; Expected date: 07/21/2025  -     Comprehensive Metabolic Panel; Future; Expected date: 07/21/2025  -     CBC Auto Differential; Future; Expected date: 07/21/2025    2. Screening for diabetes mellitus  -     Hemoglobin A1C; Future; Expected date: 07/21/2025    3. Anxiety  -     EScitalopram oxalate (LEXAPRO) 20 MG tablet; Take 2 tablets (40 mg total) by mouth once daily. Take 1 tablet by mouth daily.  For the week prior to menses, take 2 tablets by mouth daily  Dispense: 90 tablet; Refill: 3    4. Hot flashes  -     Iron and TIBC; Future; Expected date: 07/21/2025  -     Vitamin D; Future; Expected date: 07/21/2025  -     VITAMIN B12; Future; Expected date:  07/21/2025  -     FOLATE; Future; Expected date: 07/21/2025  -     Vitamin B6; Future; Expected date: 07/21/2025  Follow up lab work. Counseled obtaining 7-8 hours of nightly sleep, adequate hydration with water, well balanced diet. And 150+ minutes of exercise/week.          Ira Valladares MD

## 2025-07-21 NOTE — PATIENT INSTRUCTIONS
Take one pill of Lexapro daily (20 mg dose). About 1 week prior to cycle onset, increase to two tablets daily (40 mg total).

## 2025-07-25 LAB — W VITAMIN B6: 11 UG/L
